# Patient Record
Sex: MALE | Race: WHITE | NOT HISPANIC OR LATINO | Employment: OTHER | URBAN - METROPOLITAN AREA
[De-identification: names, ages, dates, MRNs, and addresses within clinical notes are randomized per-mention and may not be internally consistent; named-entity substitution may affect disease eponyms.]

---

## 2023-09-10 PROBLEM — I10 HYPERTENSION: Status: ACTIVE | Noted: 2023-09-10

## 2023-09-10 PROBLEM — R09.02 HYPOXIA: Status: ACTIVE | Noted: 2023-09-10

## 2023-09-10 PROBLEM — E78.5 DYSLIPIDEMIA: Status: ACTIVE | Noted: 2023-09-10

## 2023-09-10 PROBLEM — J44.9 COPD (CHRONIC OBSTRUCTIVE PULMONARY DISEASE): Status: ACTIVE | Noted: 2023-09-10

## 2023-09-10 PROBLEM — Z71.89 ACP (ADVANCE CARE PLANNING): Status: ACTIVE | Noted: 2023-09-10

## 2023-09-10 PROBLEM — I48.0 PAROXYSMAL A-FIB: Status: ACTIVE | Noted: 2023-09-10

## 2023-09-10 PROBLEM — E03.9 HYPOTHYROID: Status: ACTIVE | Noted: 2023-09-10

## 2023-09-11 PROBLEM — U07.1 COVID-19: Status: ACTIVE | Noted: 2023-09-11

## 2023-09-12 PROBLEM — D61.818 PANCYTOPENIA: Status: ACTIVE | Noted: 2023-09-12

## 2023-09-12 PROBLEM — R74.01 TRANSAMINITIS: Status: ACTIVE | Noted: 2023-09-12

## 2023-10-02 PROBLEM — R06.02 SOB (SHORTNESS OF BREATH): Status: ACTIVE | Noted: 2023-10-02

## 2023-10-02 PROBLEM — J44.1 COPD EXACERBATION: Status: ACTIVE | Noted: 2023-09-10

## 2023-10-02 PROBLEM — J43.2 CENTRILOBULAR EMPHYSEMA: Status: ACTIVE | Noted: 2023-10-02

## 2023-10-03 PROBLEM — D69.2 SENILE PURPURA: Status: ACTIVE | Noted: 2023-10-03

## 2023-10-03 PROBLEM — N40.1 BENIGN PROSTATIC HYPERPLASIA WITH URINARY FREQUENCY: Status: ACTIVE | Noted: 2023-10-03

## 2023-10-03 PROBLEM — Z79.01 CHRONIC ANTICOAGULATION: Status: ACTIVE | Noted: 2023-10-03

## 2023-10-03 PROBLEM — N18.2 STAGE 2 CHRONIC KIDNEY DISEASE: Status: ACTIVE | Noted: 2023-10-03

## 2023-10-03 PROBLEM — K21.9 GASTROESOPHAGEAL REFLUX DISEASE WITHOUT ESOPHAGITIS: Status: ACTIVE | Noted: 2023-10-03

## 2023-10-03 PROBLEM — M12.9 ARTHRITIS, MULTIPLE JOINT INVOLVEMENT: Status: ACTIVE | Noted: 2023-10-03

## 2023-10-03 PROBLEM — R35.0 BENIGN PROSTATIC HYPERPLASIA WITH URINARY FREQUENCY: Status: ACTIVE | Noted: 2023-10-03

## 2023-10-27 PROBLEM — Z86.79 S/P ABLATION OF ATRIAL FIBRILLATION: Status: ACTIVE | Noted: 2023-10-27

## 2023-10-27 PROBLEM — Z98.890 S/P ABLATION OF ATRIAL FIBRILLATION: Status: ACTIVE | Noted: 2023-10-27

## 2023-11-06 ENCOUNTER — OFFICE VISIT (OUTPATIENT)
Dept: NEPHROLOGY | Facility: CLINIC | Age: 68
End: 2023-11-06
Payer: MEDICARE

## 2023-11-06 VITALS — SYSTOLIC BLOOD PRESSURE: 100 MMHG | DIASTOLIC BLOOD PRESSURE: 66 MMHG | HEART RATE: 76 BPM | OXYGEN SATURATION: 96 %

## 2023-11-06 DIAGNOSIS — N17.9 AKI (ACUTE KIDNEY INJURY): Primary | ICD-10-CM

## 2023-11-06 DIAGNOSIS — N18.2 STAGE 2 CHRONIC KIDNEY DISEASE: ICD-10-CM

## 2023-11-06 DIAGNOSIS — E83.59 OTHER DISORDERS OF CALCIUM METABOLISM: ICD-10-CM

## 2023-11-06 PROCEDURE — 3078F PR MOST RECENT DIASTOLIC BLOOD PRESSURE < 80 MM HG: ICD-10-PCS | Mod: CPTII,S$GLB,, | Performed by: INTERNAL MEDICINE

## 2023-11-06 PROCEDURE — 1100F PR PT FALLS ASSESS DOC 2+ FALLS/FALL W/INJURY/YR: ICD-10-PCS | Mod: CPTII,S$GLB,, | Performed by: INTERNAL MEDICINE

## 2023-11-06 PROCEDURE — 3074F PR MOST RECENT SYSTOLIC BLOOD PRESSURE < 130 MM HG: ICD-10-PCS | Mod: CPTII,S$GLB,, | Performed by: INTERNAL MEDICINE

## 2023-11-06 PROCEDURE — 99999 PR PBB SHADOW E&M-EST. PATIENT-LVL IV: ICD-10-PCS | Mod: PBBFAC,,, | Performed by: INTERNAL MEDICINE

## 2023-11-06 PROCEDURE — 1159F MED LIST DOCD IN RCRD: CPT | Mod: CPTII,S$GLB,, | Performed by: INTERNAL MEDICINE

## 2023-11-06 PROCEDURE — 3078F DIAST BP <80 MM HG: CPT | Mod: CPTII,S$GLB,, | Performed by: INTERNAL MEDICINE

## 2023-11-06 PROCEDURE — 1100F PTFALLS ASSESS-DOCD GE2>/YR: CPT | Mod: CPTII,S$GLB,, | Performed by: INTERNAL MEDICINE

## 2023-11-06 PROCEDURE — 99204 PR OFFICE/OUTPT VISIT, NEW, LEVL IV, 45-59 MIN: ICD-10-PCS | Mod: S$GLB,,, | Performed by: INTERNAL MEDICINE

## 2023-11-06 PROCEDURE — 3288F FALL RISK ASSESSMENT DOCD: CPT | Mod: CPTII,S$GLB,, | Performed by: INTERNAL MEDICINE

## 2023-11-06 PROCEDURE — 3066F NEPHROPATHY DOC TX: CPT | Mod: CPTII,S$GLB,, | Performed by: INTERNAL MEDICINE

## 2023-11-06 PROCEDURE — 3066F PR DOCUMENTATION OF TREATMENT FOR NEPHROPATHY: ICD-10-PCS | Mod: CPTII,S$GLB,, | Performed by: INTERNAL MEDICINE

## 2023-11-06 PROCEDURE — 3288F PR FALLS RISK ASSESSMENT DOCUMENTED: ICD-10-PCS | Mod: CPTII,S$GLB,, | Performed by: INTERNAL MEDICINE

## 2023-11-06 PROCEDURE — 1160F RVW MEDS BY RX/DR IN RCRD: CPT | Mod: CPTII,S$GLB,, | Performed by: INTERNAL MEDICINE

## 2023-11-06 PROCEDURE — 99999 PR PBB SHADOW E&M-EST. PATIENT-LVL IV: CPT | Mod: PBBFAC,,, | Performed by: INTERNAL MEDICINE

## 2023-11-06 PROCEDURE — 1160F PR REVIEW ALL MEDS BY PRESCRIBER/CLIN PHARMACIST DOCUMENTED: ICD-10-PCS | Mod: CPTII,S$GLB,, | Performed by: INTERNAL MEDICINE

## 2023-11-06 PROCEDURE — 3074F SYST BP LT 130 MM HG: CPT | Mod: CPTII,S$GLB,, | Performed by: INTERNAL MEDICINE

## 2023-11-06 PROCEDURE — 1159F PR MEDICATION LIST DOCUMENTED IN MEDICAL RECORD: ICD-10-PCS | Mod: CPTII,S$GLB,, | Performed by: INTERNAL MEDICINE

## 2023-11-06 PROCEDURE — 99204 OFFICE O/P NEW MOD 45 MIN: CPT | Mod: S$GLB,,, | Performed by: INTERNAL MEDICINE

## 2023-11-06 NOTE — PROGRESS NOTES
Subjective:       Patient ID: All Watt is a 68 y.o. White male who presents for new patient evaluation for chronic renal failure.      All Watt is referred by Ming Lopez II, MD to be evaluated for chronic renal failure.  He had an ablation on Oct 29th and also had COVID in September 2023.  He did have a dental infection recently and has been taking amoxil.  He denies frequent NSAIDs.  He has no uremic or urinary symptoms.      Review of Systems   Constitutional:  Negative for appetite change, chills and fever.   HENT:  Negative for congestion.    Eyes:  Negative for visual disturbance.   Respiratory:  Positive for cough and shortness of breath (with exertion).    Cardiovascular:  Negative for chest pain and leg swelling.   Gastrointestinal:  Negative for abdominal pain, diarrhea, nausea and vomiting.   Genitourinary:  Negative for difficulty urinating, dysuria and hematuria.   Musculoskeletal:  Negative for myalgias.   Skin:  Negative for rash.   Neurological:  Negative for headaches.   Psychiatric/Behavioral:  Negative for sleep disturbance.          The past medical, family and social histories were reviewed for this encounter.     Past Medical History:   Diagnosis Date    A-fib     CKD (chronic kidney disease)     COPD (chronic obstructive pulmonary disease)     Elevated liver enzymes     GERD (gastroesophageal reflux disease)     Thyroid disease      Past Surgical History:   Procedure Laterality Date    ABLATION N/A 10/26/2023    Procedure: Ablation A-Fib (Cryo);  Surgeon: Abhijit Dacosta III, MD;  Location: [facility];  Service: Cardiology;  Laterality: N/A;    CARDIAC CATHETERIZATION  12/2022    CHOLECYSTECTOMY      INSERTION OF IMPLANTABLE LOOP RECORDER      MOUTH SURGERY      TONSILLECTOMY       Social History     Socioeconomic History    Marital status:    Tobacco Use    Smoking status: Former     Types: Cigarettes     Start date: 2013     Quit date: 1974     Years since quitting:  49.8     Passive exposure: Past    Smokeless tobacco: Never   Substance and Sexual Activity    Alcohol use: Not Currently    Drug use: Never     Social Determinants of Health     Food Insecurity: No Food Insecurity (10/26/2023)    Hunger Vital Sign     Worried About Running Out of Food in the Last Year: Never true     Ran Out of Food in the Last Year: Never true   Transportation Needs: No Transportation Needs (10/26/2023)    PRAPARE - Transportation     Lack of Transportation (Medical): No     Lack of Transportation (Non-Medical): No   Housing Stability: Low Risk  (10/26/2023)    Housing Stability Vital Sign     Unable to Pay for Housing in the Last Year: No     Number of Places Lived in the Last Year: 1     Unstable Housing in the Last Year: No     Current Outpatient Medications   Medication Sig    acyclovir (ZOVIRAX) 800 MG Tab Take 1,600 mg by mouth once daily.    amoxicillin (AMOXIL) 500 MG capsule Take 500 mg by mouth 3 (three) times daily.    ascorbic acid, vitamin C, (VITAMIN C) 500 MG tablet Take 1 tablet (500 mg total) by mouth 2 (two) times daily.    ELIQUIS 5 mg Tab Take 5 mg by mouth 2 (two) times daily.    FLUoxetine 20 MG capsule Take 20 mg by mouth 2 (two) times a day.    gabapentin (NEURONTIN) 300 MG capsule Take 300 mg by mouth 2 (two) times a day.    hydrOXYzine pamoate (VISTARIL) 25 MG Cap Take 25 mg by mouth 2 (two) times daily.    Lactobacillus rhamnosus GG (CULTURELLE) 10 billion cell capsule Take 1 capsule by mouth once daily.    levalbuterol (XOPENEX HFA) 45 mcg/actuation inhaler Inhale 1-2 puffs into the lungs every 4 (four) hours as needed for Wheezing. Rescue    levothyroxine (SYNTHROID, LEVOTHROID) 175 MCG tablet Take 175 mcg by mouth once daily.    loratadine (CLARITIN) 10 mg tablet Take 10 mg by mouth every evening.    montelukast (SINGULAIR) 10 mg tablet Take 10 mg by mouth every evening.    pantoprazole (PROTONIX) 40 MG tablet Take 40 mg by mouth every morning.    RAYALDEE 30 mcg Cs24  Take 1 capsule by mouth every evening.    sucralfate (CARAFATE) 1 gram tablet Take 1 tablet (1 g total) by mouth 4 (four) times daily before meals and nightly. Dissolve each dose in a small amount of water    tamsulosin (FLOMAX) 0.4 mg Cap Take 1 capsule by mouth every evening.     No current facility-administered medications for this visit.     /66 (BP Location: Left arm, Patient Position: Sitting, BP Method: Medium (Manual))   Pulse 76   SpO2 96%     Objective:      Physical Exam  Vitals reviewed.   Constitutional:       General: He is not in acute distress.     Appearance: He is well-developed.   HENT:      Head: Normocephalic and atraumatic.   Eyes:      General: No scleral icterus.     Conjunctiva/sclera: Conjunctivae normal.   Neck:      Vascular: No JVD.   Cardiovascular:      Rate and Rhythm: Normal rate and regular rhythm.      Heart sounds: Murmur heard.      No friction rub. No gallop.   Pulmonary:      Effort: Pulmonary effort is normal. No respiratory distress.      Breath sounds: Normal breath sounds. No wheezing.   Abdominal:      General: Bowel sounds are normal. There is no distension.      Palpations: Abdomen is soft.      Tenderness: There is no abdominal tenderness.   Musculoskeletal:      Cervical back: Normal range of motion.      Right lower leg: No edema.      Left lower leg: No edema.   Skin:     General: Skin is warm and dry.      Findings: No rash.   Neurological:      Mental Status: He is alert and oriented to person, place, and time.   Psychiatric:         Mood and Affect: Mood normal.         Behavior: Behavior normal.         Assessment:       1. DWIGHT (acute kidney injury)    2. Stage 2 chronic kidney disease    3. Other disorders of calcium metabolism        Lab Results   Component Value Date    CREATININE 1.40 10/26/2023    BUN 20 10/26/2023     10/26/2023    K 4.0 10/26/2023     10/26/2023    CO2 23 10/26/2023     Lab Results   Component Value Date    CALCIUM 8.4  "10/26/2023    PHOS 4.3 09/14/2023     Lab Results   Component Value Date    HCT 36.7 (L) 10/26/2023     No results found for: "UTPCR"  Plan:   Return to clinic in 6 months.  Labs for next visit include labs per standing orders.  RP ohd pth in 1 month.  Baseline creatinine is 1.0-1.2 since 2023.  He has had several problems lately which would have caused his Scr to be up a bit thus plan to recheck in a month to reassess.      "

## 2023-12-15 ENCOUNTER — TELEPHONE (OUTPATIENT)
Dept: NEPHROLOGY | Facility: CLINIC | Age: 68
End: 2023-12-15
Payer: MEDICARE

## 2023-12-15 NOTE — TELEPHONE ENCOUNTER
----- Message from Loren Mills sent at 12/15/2023 10:29 AM CST -----  Regarding: Call back  Type:  Needs Medical Advice    Who Called: Titus DE LOS SANTOS    Would the patient rather a call back or a response via [facility]? Call back    Best Call Back Number: [phone] fax [phone]    Additional Information: Need to know if a medical necessity form was sen to the office if so can it be signed and return. Thank you

## 2024-01-29 PROBLEM — N18.31 STAGE 3A CHRONIC KIDNEY DISEASE: Status: ACTIVE | Noted: 2024-01-29

## 2024-01-29 PROBLEM — N25.81 SECONDARY HYPERPARATHYROIDISM OF RENAL ORIGIN: Status: ACTIVE | Noted: 2024-01-29

## 2024-01-29 PROBLEM — D61.818 PANCYTOPENIA: Status: RESOLVED | Noted: 2023-09-12 | Resolved: 2024-01-29

## 2024-01-29 PROBLEM — F32.4 MAJOR DEPRESSIVE DISORDER, SINGLE EPISODE, IN PARTIAL REMISSION: Status: ACTIVE | Noted: 2024-01-29

## 2024-05-07 ENCOUNTER — OFFICE VISIT (OUTPATIENT)
Dept: NEPHROLOGY | Facility: CLINIC | Age: 69
End: 2024-05-07
Payer: MEDICARE

## 2024-05-07 VITALS
BODY MASS INDEX: 26.3 KG/M2 | DIASTOLIC BLOOD PRESSURE: 50 MMHG | OXYGEN SATURATION: 94 % | HEIGHT: 68 IN | SYSTOLIC BLOOD PRESSURE: 100 MMHG | HEART RATE: 82 BPM

## 2024-05-07 DIAGNOSIS — N18.2 STAGE 2 CHRONIC KIDNEY DISEASE: Primary | ICD-10-CM

## 2024-05-07 DIAGNOSIS — N17.9 AKI (ACUTE KIDNEY INJURY): ICD-10-CM

## 2024-05-07 PROCEDURE — 3288F FALL RISK ASSESSMENT DOCD: CPT | Mod: CPTII,S$GLB,, | Performed by: INTERNAL MEDICINE

## 2024-05-07 PROCEDURE — 3066F NEPHROPATHY DOC TX: CPT | Mod: CPTII,S$GLB,, | Performed by: INTERNAL MEDICINE

## 2024-05-07 PROCEDURE — 1159F MED LIST DOCD IN RCRD: CPT | Mod: CPTII,S$GLB,, | Performed by: INTERNAL MEDICINE

## 2024-05-07 PROCEDURE — 99999 PR PBB SHADOW E&M-EST. PATIENT-LVL II: CPT | Mod: PBBFAC,,, | Performed by: INTERNAL MEDICINE

## 2024-05-07 PROCEDURE — 99214 OFFICE O/P EST MOD 30 MIN: CPT | Mod: S$GLB,,, | Performed by: INTERNAL MEDICINE

## 2024-05-07 PROCEDURE — 3074F SYST BP LT 130 MM HG: CPT | Mod: CPTII,S$GLB,, | Performed by: INTERNAL MEDICINE

## 2024-05-07 PROCEDURE — 3078F DIAST BP <80 MM HG: CPT | Mod: CPTII,S$GLB,, | Performed by: INTERNAL MEDICINE

## 2024-05-07 PROCEDURE — 1101F PT FALLS ASSESS-DOCD LE1/YR: CPT | Mod: CPTII,S$GLB,, | Performed by: INTERNAL MEDICINE

## 2024-05-07 PROCEDURE — 1160F RVW MEDS BY RX/DR IN RCRD: CPT | Mod: CPTII,S$GLB,, | Performed by: INTERNAL MEDICINE

## 2024-05-07 PROCEDURE — 3008F BODY MASS INDEX DOCD: CPT | Mod: CPTII,S$GLB,, | Performed by: INTERNAL MEDICINE

## 2024-05-07 NOTE — PROGRESS NOTES
"  Subjective:       Patient ID: All Watt is a 69 y.o. White male who presents for return patient evaluation for chronic renal failure.      He had an ablation on Oct 29th and also had COVID in September 2023 then had the flu at the end of the year.  He denies frequent NSAIDs.  He has no uremic or urinary symptoms.      Review of Systems   Constitutional:  Negative for appetite change, chills and fever.   HENT:  Positive for hearing loss (L ear). Negative for congestion.    Eyes:  Negative for visual disturbance.   Respiratory:  Positive for cough and shortness of breath (with exertion).    Cardiovascular:  Negative for chest pain and leg swelling.   Gastrointestinal:  Negative for abdominal pain, diarrhea, nausea and vomiting.   Genitourinary:  Negative for difficulty urinating, dysuria and hematuria.   Musculoskeletal:  Negative for myalgias.   Skin:  Negative for rash.   Neurological:  Positive for light-headedness. Negative for headaches.   Psychiatric/Behavioral:  Negative for sleep disturbance.        The past medical, family and social histories were reviewed for this encounter.     BP (!) 100/50 (BP Location: Right arm, Patient Position: Sitting, BP Method: Medium (Manual))   Pulse 82   Ht 5' 8" (1.727 m)   SpO2 (!) 94%   BMI 26.30 kg/m²     Objective:      Physical Exam  Vitals reviewed.   Constitutional:       General: He is not in acute distress.     Appearance: He is well-developed.   HENT:      Head: Normocephalic and atraumatic.   Eyes:      General: No scleral icterus.     Conjunctiva/sclera: Conjunctivae normal.   Neck:      Vascular: No JVD.   Cardiovascular:      Rate and Rhythm: Normal rate and regular rhythm.      Heart sounds: Murmur heard.      No friction rub. No gallop.   Pulmonary:      Effort: Pulmonary effort is normal. No respiratory distress.      Breath sounds: Normal breath sounds. No wheezing.   Abdominal:      General: Bowel sounds are normal. There is no distension.      " Palpations: Abdomen is soft.      Tenderness: There is no abdominal tenderness.   Musculoskeletal:      Cervical back: Normal range of motion.      Right lower leg: No edema.      Left lower leg: No edema.   Skin:     General: Skin is warm and dry.      Findings: No rash.   Neurological:      Mental Status: He is alert and oriented to person, place, and time.   Psychiatric:         Mood and Affect: Mood normal.         Behavior: Behavior normal.         Assessment:       1. Stage 2 chronic kidney disease        Lab Results   Component Value Date    CREATININE 1.40 11/30/2023    CREATININE 1.40 11/30/2023    BUN 20 11/30/2023    BUN 20 11/30/2023     11/30/2023     11/30/2023    K 4.5 11/30/2023    K 4.5 11/30/2023     11/30/2023     11/30/2023    CO2 24 11/30/2023    CO2 24 11/30/2023     Lab Results   Component Value Date    PTH 37.0 11/30/2023    PTH 37.0 11/30/2023    CALCIUM 8.8 11/30/2023    CALCIUM 8.8 11/30/2023    PHOS 4.6 (H) 11/30/2023    PHOS 4.6 (H) 11/30/2023     Lab Results   Component Value Date    HCT 36.7 (L) 10/26/2023     Prot/Creat Ratio, Urine   Date Value Ref Range Status   11/30/2023 Unable to calculate 15.0 - 68.0 mg/g Final     Plan:   Return to clinic in 6 months.  Labs for next visit include labs per standing orders.  RP ua micro us this month.  Baseline creatinine is 1.0-1.2 since 2023.  He has had several problems the end of last year which would have caused his Scr to be up a bit thus plan to recheck this month to reassess.  PTH is 37 with a calcium of 8.8.

## 2024-08-07 PROBLEM — E78.5 DYSLIPIDEMIA: Status: RESOLVED | Noted: 2023-09-10 | Resolved: 2024-08-07

## 2024-08-07 PROBLEM — I10 HYPERTENSION: Status: RESOLVED | Noted: 2023-09-10 | Resolved: 2024-08-07

## 2024-10-30 ENCOUNTER — HOSPITAL ENCOUNTER (OUTPATIENT)
Dept: RADIOLOGY | Facility: HOSPITAL | Age: 69
Discharge: HOME | End: 2024-10-30
Attending: INTERNAL MEDICINE
Payer: MEDICARE

## 2024-10-30 DIAGNOSIS — N17.9 AKI (ACUTE KIDNEY INJURY): ICD-10-CM

## 2024-10-30 PROCEDURE — 76770 US EXAM ABDO BACK WALL COMP: CPT | Mod: TC,PO

## 2024-10-30 PROCEDURE — 76770 US EXAM ABDO BACK WALL COMP: CPT | Mod: 26,,, | Performed by: RADIOLOGY

## 2024-11-05 ENCOUNTER — OFFICE VISIT (OUTPATIENT)
Dept: NEPHROLOGY | Facility: CLINIC | Age: 69
End: 2024-11-05
Payer: MEDICARE

## 2024-11-05 VITALS — SYSTOLIC BLOOD PRESSURE: 104 MMHG | DIASTOLIC BLOOD PRESSURE: 62 MMHG

## 2024-11-05 DIAGNOSIS — N18.31 STAGE 3A CHRONIC KIDNEY DISEASE: Primary | ICD-10-CM

## 2024-11-05 PROCEDURE — 99214 OFFICE O/P EST MOD 30 MIN: CPT | Mod: S$GLB,,, | Performed by: INTERNAL MEDICINE

## 2024-11-05 PROCEDURE — 99999 PR PBB SHADOW E&M-EST. PATIENT-LVL III: CPT | Mod: PBBFAC,,, | Performed by: INTERNAL MEDICINE

## 2024-11-05 PROCEDURE — 1159F MED LIST DOCD IN RCRD: CPT | Mod: CPTII,S$GLB,, | Performed by: INTERNAL MEDICINE

## 2024-11-05 PROCEDURE — 3078F DIAST BP <80 MM HG: CPT | Mod: CPTII,S$GLB,, | Performed by: INTERNAL MEDICINE

## 2024-11-05 PROCEDURE — 3074F SYST BP LT 130 MM HG: CPT | Mod: CPTII,S$GLB,, | Performed by: INTERNAL MEDICINE

## 2024-11-05 PROCEDURE — 1160F RVW MEDS BY RX/DR IN RCRD: CPT | Mod: CPTII,S$GLB,, | Performed by: INTERNAL MEDICINE

## 2024-11-05 PROCEDURE — 3066F NEPHROPATHY DOC TX: CPT | Mod: CPTII,S$GLB,, | Performed by: INTERNAL MEDICINE

## 2024-11-05 NOTE — PROGRESS NOTES
Subjective:       Patient ID: All Watt is a 69 y.o. White male who presents for return patient evaluation for chronic renal failure.      He had an ablation on Oct 29th and also had COVID in September 2023 then had the flu at the end of the year.  He denies frequent NSAIDs.  He has no uremic or urinary symptoms.      Review of Systems   Constitutional:  Negative for appetite change, chills and fever.   HENT:  Positive for hearing loss (L ear). Negative for congestion.    Eyes:  Negative for visual disturbance.   Respiratory:  Positive for cough and shortness of breath (with exertion).    Cardiovascular:  Negative for chest pain and leg swelling.   Gastrointestinal:  Negative for abdominal pain, diarrhea, nausea and vomiting.   Genitourinary:  Negative for difficulty urinating, dysuria and hematuria.   Musculoskeletal:  Negative for myalgias.   Skin:  Negative for rash.   Neurological:  Positive for light-headedness. Negative for headaches.   Psychiatric/Behavioral:  Negative for sleep disturbance.        The past medical, family and social histories were reviewed for this encounter.     /62 (BP Location: Left arm, Patient Position: Sitting)     Objective:      Physical Exam  Vitals reviewed.   Constitutional:       General: He is not in acute distress.     Appearance: He is well-developed.   HENT:      Head: Normocephalic and atraumatic.   Eyes:      General: No scleral icterus.     Conjunctiva/sclera: Conjunctivae normal.   Neck:      Vascular: No JVD.   Cardiovascular:      Rate and Rhythm: Normal rate and regular rhythm.      Heart sounds: Murmur heard.      No friction rub. No gallop.   Pulmonary:      Effort: Pulmonary effort is normal. No respiratory distress.      Breath sounds: Normal breath sounds. No wheezing.   Abdominal:      General: Bowel sounds are normal. There is no distension.      Palpations: Abdomen is soft.      Tenderness: There is no abdominal tenderness.   Musculoskeletal:       Cervical back: Normal range of motion.      Right lower leg: No edema.      Left lower leg: No edema.   Skin:     General: Skin is warm and dry.      Findings: No rash.   Neurological:      Mental Status: He is alert and oriented to person, place, and time.   Psychiatric:         Mood and Affect: Mood normal.         Behavior: Behavior normal.         Assessment:       1. Stage 2 chronic kidney disease        Lab Results   Component Value Date    CREATININE 1.5 (H) 10/30/2024    BUN 21 10/30/2024     10/30/2024    K 4.8 10/30/2024     10/30/2024    CO2 26 10/30/2024     Lab Results   Component Value Date    PTH 53.3 10/30/2024    CALCIUM 9.2 10/30/2024    PHOS 4.4 10/30/2024     Lab Results   Component Value Date    HCT 36.7 (L) 10/26/2023     Prot/Creat Ratio, Urine   Date Value Ref Range Status   10/30/2024 Unable to calculate 0.00 - 0.20 Final   05/16/2024 Unable to calculate 15.0 - 68.0 mg/g Final   11/30/2023 Unable to calculate 15.0 - 68.0 mg/g Final     Plan:   Return to clinic in 6 months.  Labs for next visit include labs per standing orders q 3 months.    Baseline creatinine is 1.0-1.2 since 2023.  PTH is 53 with a calcium of 9.2.  UPC is negative.  He had COVID, an ablation with contrast and the flu all within a short period of time in late 2023.    KFRE 2-Year: 0.2% at 5/16/2024  1:03 PM  Calculated from:  Serum Creatinine: 1.49 mg/dL at 5/16/2024  1:03 PM  Urine Albumin Creatinine Ratio: 10 mg/g creat at 6/23/2023 10:47 AM  Age: 69 years  Sex: Male at 5/16/2024  1:03 PM  Has CKD-3 to CKD-5: Yes    KFRE 5-Year: 0.5% at 5/16/2024  1:03 PM  Calculated from:  Serum Creatinine: 1.49 mg/dL at 5/16/2024  1:03 PM  Urine Albumin Creatinine Ratio: 10 mg/g creat at 6/23/2023 10:47 AM  Age: 69 years  Sex: Male at 5/16/2024  1:03 PM  Has CKD-3 to CKD-5: Yes

## 2025-01-31 ENCOUNTER — PATIENT MESSAGE (OUTPATIENT)
Dept: NEPHROLOGY | Facility: CLINIC | Age: 70
End: 2025-01-31
Payer: MEDICARE

## 2025-01-31 NOTE — TELEPHONE ENCOUNTER
----- Message from Loern sent at 1/31/2025  4:25 PM CST -----  Regarding: Call  Type:  RX Refill Request    Who Called: Pt Daughter    Refill or New Rx:Refill    RX Name and Strength:RAYALDEE 30 mcg Cs24       Preferred Pharmacy with phone number:  [facility] Address: [address] [place] [address] Phone: [phone]    Local or Mail Order:Local    Ordering Provider:.    Would the patient rather a call back or a response via [facility]? Call    Best Call Back Number:[phone]    Additional Information: Thanks

## 2025-02-03 RX ORDER — CALCIFEDIOL 30 UG/1
1 CAPSULE, EXTENDED RELEASE ORAL NIGHTLY
Qty: 90 CAPSULE | Refills: 1 | Status: SHIPPED | OUTPATIENT
Start: 2025-02-03 | End: 2025-02-12 | Stop reason: SDUPTHER

## 2025-02-12 ENCOUNTER — TELEPHONE (OUTPATIENT)
Dept: NEPHROLOGY | Facility: CLINIC | Age: 70
End: 2025-02-12
Payer: MEDICARE

## 2025-02-12 RX ORDER — CALCIFEDIOL 30 UG/1
1 CAPSULE, EXTENDED RELEASE ORAL NIGHTLY
Qty: 90 CAPSULE | Refills: 1 | Status: SHIPPED | OUTPATIENT
Start: 2025-02-12 | End: 2025-02-26 | Stop reason: ALTCHOICE

## 2025-02-13 NOTE — TELEPHONE ENCOUNTER
----- Message from Gisselle sent at 2/13/2025 12:17 PM CST -----  Regarding: Pharm auth  Contact: pharm  Type:  Pharmacy Calling to Clarify an RX    Name of Caller:pharm    Pharmacy Name:  [facility] ([facility]) #[id number] - [facility] [address] AT [facility]  [address]  [place] [facility] [address]  Phone: [phone] Fax: [phone]    Prescription Name:calcifediol (RAYALDEE) 30 mcg Cs24      Best Call Back Number:[phone]    Additional Information:   PA needed. They will take care of getting it if doctor wants to fax over clinic notes.  [phone]

## 2025-02-26 ENCOUNTER — TELEPHONE (OUTPATIENT)
Dept: NEPHROLOGY | Facility: CLINIC | Age: 70
End: 2025-02-26
Payer: MEDICARE

## 2025-02-26 RX ORDER — CALCITRIOL 0.25 UG/1
0.25 CAPSULE ORAL
Qty: 39 CAPSULE | Refills: 1 | Status: SHIPPED | OUTPATIENT
Start: 2025-02-26

## 2025-02-26 NOTE — TELEPHONE ENCOUNTER
Received Rayalddavid denial letter from patient's daughter, Sumaya. Form uploaded into patient's media.

## 2025-03-04 ENCOUNTER — PATIENT MESSAGE (OUTPATIENT)
Dept: NEPHROLOGY | Facility: CLINIC | Age: 70
End: 2025-03-04
Payer: MEDICARE

## 2025-03-05 RX ORDER — CALCITRIOL 0.25 UG/1
0.25 CAPSULE ORAL
Qty: 36 CAPSULE | Refills: 1 | Status: SHIPPED | OUTPATIENT
Start: 2025-03-05 | End: 2025-09-01

## 2025-03-05 NOTE — TELEPHONE ENCOUNTER
Spoke with specialty pharmacy.  Rayaldee was sent Feb 18 at no charge and should be good for 5 more refills.  Calcitriol was cancelled at the specialty pharmacy since they are specialty and he is already on Rayaldee.       Spoke with Daughter who said they got a letter that it is not covered.  Called back specialty pharmacy to confirm no PA.  Asked them to cancel future shipments per patient request.   They confirmed they got the letter for no coverage.     At daughters request will call calcitriol to local pharmacy to [facility].

## 2025-04-28 ENCOUNTER — TELEPHONE (OUTPATIENT)
Dept: NEPHROLOGY | Facility: CLINIC | Age: 70
End: 2025-04-28
Payer: MEDICARE

## 2025-04-28 NOTE — TELEPHONE ENCOUNTER
Attempted to call patient to reschedule appointment. Unable to reach them at this time. Left voicemail.

## 2025-05-14 ENCOUNTER — OFFICE VISIT (OUTPATIENT)
Dept: NEPHROLOGY | Facility: CLINIC | Age: 70
End: 2025-05-14
Payer: MEDICARE

## 2025-05-14 VITALS
HEIGHT: 68 IN | OXYGEN SATURATION: 95 % | DIASTOLIC BLOOD PRESSURE: 50 MMHG | SYSTOLIC BLOOD PRESSURE: 104 MMHG | HEART RATE: 78 BPM | WEIGHT: 175.69 LBS | BODY MASS INDEX: 26.63 KG/M2

## 2025-05-14 DIAGNOSIS — N18.2 STAGE 2 CHRONIC KIDNEY DISEASE: Primary | ICD-10-CM

## 2025-05-14 PROCEDURE — 3078F DIAST BP <80 MM HG: CPT | Mod: CPTII,S$GLB,, | Performed by: INTERNAL MEDICINE

## 2025-05-14 PROCEDURE — 3008F BODY MASS INDEX DOCD: CPT | Mod: CPTII,S$GLB,, | Performed by: INTERNAL MEDICINE

## 2025-05-14 PROCEDURE — 3044F HG A1C LEVEL LT 7.0%: CPT | Mod: CPTII,S$GLB,, | Performed by: INTERNAL MEDICINE

## 2025-05-14 PROCEDURE — 1159F MED LIST DOCD IN RCRD: CPT | Mod: CPTII,S$GLB,, | Performed by: INTERNAL MEDICINE

## 2025-05-14 PROCEDURE — 99214 OFFICE O/P EST MOD 30 MIN: CPT | Mod: S$GLB,,, | Performed by: INTERNAL MEDICINE

## 2025-05-14 PROCEDURE — 1160F RVW MEDS BY RX/DR IN RCRD: CPT | Mod: CPTII,S$GLB,, | Performed by: INTERNAL MEDICINE

## 2025-05-14 PROCEDURE — 99999 PR PBB SHADOW E&M-EST. PATIENT-LVL III: CPT | Mod: PBBFAC,,, | Performed by: INTERNAL MEDICINE

## 2025-05-14 PROCEDURE — 3066F NEPHROPATHY DOC TX: CPT | Mod: CPTII,S$GLB,, | Performed by: INTERNAL MEDICINE

## 2025-05-14 PROCEDURE — 3074F SYST BP LT 130 MM HG: CPT | Mod: CPTII,S$GLB,, | Performed by: INTERNAL MEDICINE

## 2025-05-14 NOTE — PROGRESS NOTES
"  Subjective:       Patient ID: All Watt is a 70 y.o. White male who presents for return patient evaluation for chronic renal failure.      He had an ablation on Oct 29th and also had COVID in September 2023 then had the flu at the end of the year.  He denies frequent NSAIDs.  He has no uremic or urinary symptoms.      Review of Systems   Constitutional:  Negative for appetite change, chills and fever.   HENT:  Positive for hearing loss (L ear). Negative for congestion.    Eyes:  Negative for visual disturbance.   Respiratory:  Positive for cough and shortness of breath (with exertion).    Cardiovascular:  Negative for chest pain and leg swelling.   Gastrointestinal:  Negative for abdominal pain, diarrhea, nausea and vomiting.   Genitourinary:  Negative for difficulty urinating, dysuria and hematuria.   Musculoskeletal:  Negative for myalgias.   Skin:  Negative for rash.   Neurological:  Positive for light-headedness. Negative for headaches.   Psychiatric/Behavioral:  Negative for sleep disturbance.        The past medical, family and social histories were reviewed for this encounter.     BP (!) 104/50 (BP Location: Left arm, Patient Position: Sitting)   Pulse 78   Ht 5' 8" (1.727 m)   Wt 79.7 kg (175 lb 11.3 oz)   SpO2 95%   BMI 26.72 kg/m²     Objective:      Physical Exam  Vitals reviewed.   Constitutional:       General: He is not in acute distress.     Appearance: He is well-developed.   HENT:      Head: Normocephalic and atraumatic.   Eyes:      General: No scleral icterus.     Conjunctiva/sclera: Conjunctivae normal.   Neck:      Vascular: No JVD.   Cardiovascular:      Rate and Rhythm: Normal rate and regular rhythm.      Heart sounds: Murmur heard.      No friction rub. No gallop.   Pulmonary:      Effort: Pulmonary effort is normal. No respiratory distress.      Breath sounds: Normal breath sounds. No wheezing.   Abdominal:      General: Bowel sounds are normal. There is no distension.      " Palpations: Abdomen is soft.      Tenderness: There is no abdominal tenderness.   Musculoskeletal:      Cervical back: Normal range of motion.      Right lower leg: No edema.      Left lower leg: No edema.   Skin:     General: Skin is warm and dry.      Findings: No rash.   Neurological:      Mental Status: He is alert and oriented to person, place, and time.   Psychiatric:         Mood and Affect: Mood normal.         Behavior: Behavior normal.         Assessment:       1. Stage 3a chronic kidney disease        Lab Results   Component Value Date    CREATININE 1.13 05/08/2025    BUN 15 05/08/2025     05/08/2025    K 4.0 05/08/2025     05/08/2025    CO2 27 05/08/2025     Lab Results   Component Value Date    PTH 51.2 05/08/2025    CALCIUM 9.1 05/08/2025    PHOS 4.2 05/08/2025     Lab Results   Component Value Date    HCT 40.3 03/25/2025     Prot/Creat Ratio, Urine   Date Value Ref Range Status   05/08/2025 0.1 0.0 - 0.2 Final   10/30/2024 Unable to calculate 0.00 - 0.20 Final   05/16/2024 Unable to calculate 15.0 - 68.0 mg/g Final     Plan:   Return to clinic in 12 months.  Labs for next visit include labs per standing orders q 6 months.    Baseline creatinine is 1.0-1.2 since 2023.  PTH is 53 with a calcium of 9.2.  UPC is negative.  Renal US R 10.1 cm L 10.3 cm.  He had COVID, an ablation with contrast and the flu all within a short period of time in late 2023.    KFRE 2-Year: 0.2% at 5/16/2024  1:03 PM  Calculated from:  Serum Creatinine: 1.49 mg/dL at 5/16/2024  1:03 PM  Urine Albumin Creatinine Ratio: 10 mg/g creat at 6/23/2023 10:47 AM  Age: 69 years  Sex: Male at 5/16/2024  1:03 PM  Has CKD-3 to CKD-5: Yes    KFRE 5-Year: 0.5% at 5/16/2024  1:03 PM  Calculated from:  Serum Creatinine: 1.49 mg/dL at 5/16/2024  1:03 PM  Urine Albumin Creatinine Ratio: 10 mg/g creat at 6/23/2023 10:47 AM  Age: 69 years  Sex: Male at 5/16/2024  1:03 PM  Has CKD-3 to CKD-5: Yes